# Patient Record
Sex: MALE | Race: BLACK OR AFRICAN AMERICAN | NOT HISPANIC OR LATINO | Employment: OTHER | URBAN - METROPOLITAN AREA
[De-identification: names, ages, dates, MRNs, and addresses within clinical notes are randomized per-mention and may not be internally consistent; named-entity substitution may affect disease eponyms.]

---

## 2018-05-22 ENCOUNTER — TELEPHONE (OUTPATIENT)
Dept: TRANSPLANT | Facility: CLINIC | Age: 68
End: 2018-05-22

## 2018-07-05 DIAGNOSIS — Z76.82 ORGAN TRANSPLANT CANDIDATE: Primary | ICD-10-CM

## 2018-07-18 ENCOUNTER — HOSPITAL ENCOUNTER (OUTPATIENT)
Dept: RADIOLOGY | Facility: HOSPITAL | Age: 68
Discharge: HOME | End: 2018-07-18
Attending: NURSE PRACTITIONER
Payer: MEDICARE

## 2018-07-18 ENCOUNTER — OFFICE VISIT (OUTPATIENT)
Dept: TRANSPLANT | Facility: CLINIC | Age: 68
End: 2018-07-18
Payer: MEDICARE

## 2018-07-18 ENCOUNTER — CLINICAL SUPPORT (OUTPATIENT)
Dept: INFECTIOUS DISEASES | Facility: CLINIC | Age: 68
End: 2018-07-18
Payer: MEDICARE

## 2018-07-18 ENCOUNTER — DOCUMENTATION ONLY (OUTPATIENT)
Dept: TRANSPLANT | Facility: CLINIC | Age: 68
End: 2018-07-18

## 2018-07-18 VITALS
OXYGEN SATURATION: 97 % | RESPIRATION RATE: 16 BRPM | HEART RATE: 73 BPM | BODY MASS INDEX: 32.36 KG/M2 | WEIGHT: 218.5 LBS | DIASTOLIC BLOOD PRESSURE: 75 MMHG | SYSTOLIC BLOOD PRESSURE: 136 MMHG | TEMPERATURE: 98 F | HEIGHT: 69 IN

## 2018-07-18 DIAGNOSIS — D63.1 ANEMIA OF RENAL DISEASE: Chronic | ICD-10-CM

## 2018-07-18 DIAGNOSIS — Z76.82 ORGAN TRANSPLANT CANDIDATE: Primary | ICD-10-CM

## 2018-07-18 DIAGNOSIS — Z01.818 PRE-TRANSPLANT EVALUATION FOR CKD (CHRONIC KIDNEY DISEASE): ICD-10-CM

## 2018-07-18 DIAGNOSIS — E11.21 TYPE 2 DIABETES MELLITUS WITH DIABETIC NEPHROPATHY, WITH LONG-TERM CURRENT USE OF INSULIN: Chronic | ICD-10-CM

## 2018-07-18 DIAGNOSIS — Z79.4 TYPE 2 DIABETES MELLITUS WITH DIABETIC NEPHROPATHY, WITH LONG-TERM CURRENT USE OF INSULIN: Chronic | ICD-10-CM

## 2018-07-18 DIAGNOSIS — N18.6 ESRD ON DIALYSIS: Primary | Chronic | ICD-10-CM

## 2018-07-18 DIAGNOSIS — Z76.82 ORGAN TRANSPLANT CANDIDATE: ICD-10-CM

## 2018-07-18 DIAGNOSIS — N18.9 ANEMIA OF RENAL DISEASE: Chronic | ICD-10-CM

## 2018-07-18 DIAGNOSIS — I10 ESSENTIAL HYPERTENSION: ICD-10-CM

## 2018-07-18 DIAGNOSIS — R30.0 DYSURIA: ICD-10-CM

## 2018-07-18 DIAGNOSIS — Z87.898 HISTORY OF SYNCOPE: ICD-10-CM

## 2018-07-18 DIAGNOSIS — Z99.2 ESRD ON DIALYSIS: Primary | Chronic | ICD-10-CM

## 2018-07-18 DIAGNOSIS — E78.5 HYPERLIPIDEMIA, UNSPECIFIED HYPERLIPIDEMIA TYPE: ICD-10-CM

## 2018-07-18 LAB
BACTERIA #/AREA URNS AUTO: ABNORMAL /HPF
BILIRUB UR QL STRIP: NEGATIVE
CLARITY UR REFRACT.AUTO: CLEAR
COLOR UR AUTO: YELLOW
CREAT UR-MCNC: 168 MG/DL
GLUCOSE UR QL STRIP: ABNORMAL
HGB UR QL STRIP: NEGATIVE
HYALINE CASTS UR QL AUTO: 4 /LPF
KETONES UR QL STRIP: NEGATIVE
LEUKOCYTE ESTERASE UR QL STRIP: NEGATIVE
MICROSCOPIC COMMENT: ABNORMAL
NITRITE UR QL STRIP: NEGATIVE
PH UR STRIP: 6 [PH] (ref 5–8)
PROT UR QL STRIP: ABNORMAL
PROT UR-MCNC: 98 MG/DL
PROT/CREAT RATIO, UR: 0.58
RBC #/AREA URNS AUTO: 1 /HPF (ref 0–4)
SP GR UR STRIP: 1.01 (ref 1–1.03)
URN SPEC COLLECT METH UR: ABNORMAL
UROBILINOGEN UR STRIP-ACNC: 2 EU/DL
WBC #/AREA URNS AUTO: 1 /HPF (ref 0–5)

## 2018-07-18 PROCEDURE — 3045F PR MOST RECENT HEMOGLOBIN A1C LEVEL 7.0-9.0%: CPT | Mod: CPTII,S$GLB,TXP, | Performed by: INTERNAL MEDICINE

## 2018-07-18 PROCEDURE — 72170 X-RAY EXAM OF PELVIS: CPT | Mod: TC,FY,TXP

## 2018-07-18 PROCEDURE — 71046 X-RAY EXAM CHEST 2 VIEWS: CPT | Mod: TC,FY,TXP

## 2018-07-18 PROCEDURE — 76770 US EXAM ABDO BACK WALL COMP: CPT | Mod: 26,TXP,, | Performed by: RADIOLOGY

## 2018-07-18 PROCEDURE — 99203 OFFICE O/P NEW LOW 30 MIN: CPT | Mod: S$GLB,TXP,, | Performed by: TRANSPLANT SURGERY

## 2018-07-18 PROCEDURE — 99204 OFFICE O/P NEW MOD 45 MIN: CPT | Mod: S$GLB,TXP,, | Performed by: PHYSICIAN ASSISTANT

## 2018-07-18 PROCEDURE — 84156 ASSAY OF PROTEIN URINE: CPT | Mod: TXP

## 2018-07-18 PROCEDURE — 81001 URINALYSIS AUTO W/SCOPE: CPT | Mod: TXP

## 2018-07-18 PROCEDURE — 99999 PR PBB SHADOW E&M-EST. PATIENT-LVL IV: CPT | Mod: PBBFAC,TXP,, | Performed by: INTERNAL MEDICINE

## 2018-07-18 PROCEDURE — 76770 US EXAM ABDO BACK WALL COMP: CPT | Mod: TC,TXP

## 2018-07-18 PROCEDURE — 90632 HEPA VACCINE ADULT IM: CPT | Mod: S$GLB,TXP,,

## 2018-07-18 PROCEDURE — 71046 X-RAY EXAM CHEST 2 VIEWS: CPT | Mod: 26,TXP,, | Performed by: RADIOLOGY

## 2018-07-18 PROCEDURE — 72170 X-RAY EXAM OF PELVIS: CPT | Mod: 26,TXP,, | Performed by: RADIOLOGY

## 2018-07-18 PROCEDURE — 99205 OFFICE O/P NEW HI 60 MIN: CPT | Mod: S$GLB,TXP,, | Performed by: INTERNAL MEDICINE

## 2018-07-18 PROCEDURE — 90471 IMMUNIZATION ADMIN: CPT | Mod: S$GLB,TXP,,

## 2018-07-18 RX ORDER — PREGABALIN 150 MG/1
150 CAPSULE ORAL 2 TIMES DAILY
COMMUNITY

## 2018-07-18 RX ORDER — PANTOPRAZOLE SODIUM 40 MG/1
40 TABLET, DELAYED RELEASE ORAL DAILY
COMMUNITY

## 2018-07-18 RX ORDER — ATORVASTATIN CALCIUM 40 MG/1
40 TABLET, FILM COATED ORAL DAILY
COMMUNITY

## 2018-07-18 RX ORDER — INSULIN ASPART 100 [IU]/ML
INJECTION, SOLUTION INTRAVENOUS; SUBCUTANEOUS
COMMUNITY

## 2018-07-18 RX ORDER — CLONIDINE HYDROCHLORIDE 0.2 MG/1
0.2 TABLET ORAL 2 TIMES DAILY
COMMUNITY

## 2018-07-18 RX ORDER — DULOXETIN HYDROCHLORIDE 60 MG/1
60 CAPSULE, DELAYED RELEASE ORAL DAILY
COMMUNITY

## 2018-07-18 RX ORDER — TAMSULOSIN HYDROCHLORIDE 0.4 MG/1
0.4 CAPSULE ORAL DAILY
COMMUNITY

## 2018-07-18 RX ORDER — ASPIRIN 81 MG/1
81 TABLET ORAL DAILY
COMMUNITY

## 2018-07-18 RX ORDER — TRAMADOL HYDROCHLORIDE 50 MG/1
50 TABLET ORAL 2 TIMES DAILY
COMMUNITY

## 2018-07-18 NOTE — PATIENT INSTRUCTIONS
1. Try to exercise more   2. Check your blood sugar when you get sweaty  3. Give a urine specimen today   4. Stop chewing tobacco and all tobacco products  5. Try to look for any potential donors

## 2018-07-18 NOTE — PROGRESS NOTES
Pre Transplant Infectious Diseases Consult  Kidney Transplant Recipient Evaluation    Requesting Physician: Mando Pedersen MD    Reason for Visit:    Chief Complaint   Patient presents with    Kidney Transplant Pre-evaluation     History of Present Illness  Quinton Nguyen is a 68 y.o. year old Black or  male with advanced Kidney disease currently being evaluated for Kidney transplant. He has been on HD since Sept 2017 through fistula.   Patient denies any recent fever, chills, or infective illnesses.      1) Do you have a history of:   YES NO   Diabetes      [x] []     Diabetic Foot Infection/Bone Infection  []        [x]     Surgical Removal of Spleen   []  [x]                  2) Have you had recurrent infections involving:             YES NO  Sinus infections  []         [x]   Sore Throat   []         [x]                 Prostate Infections  []         [x]              Bladder Infections  []         [x]                     Kidney Infections  []         [x]                               Intestinal Infections  []         [x]      Skin Infections   []         [x]       Reproductive Infections          []  [x]   Periodontal Disease  []         [x]        3)Have you ever had: YES     NO UNKNOWN      Chicken Pox   []         [x]  []   Shingles   [x]         []  [] Approx 5 yrs  Orolabial Herpes             [x]  []  []   Genital Herpes  []         [x]  []   Cytomegalovirus  []         [x]  []   Barney-Barr Virus  []         [x]  []   Genital Warts   []         [x]  []   Hepatitis A   []         [x]  []   Hepatitis B   []         [x]  []   Hepatitis C   []         [x]  []   Syphilis   []         [x]  []   Gonorrhea   []         [x]  []   Pelvic Inflammatory   Disease   []         []  []   Chlamydia Infection  []         []  [x]   Intestinal parasites   or worms   []         []  [x]   Fungal Infections  []         []  [x]   Blood Infections  []         []  [x]       Comment:       4) Have you ever been  exposed   YES NO  To someone with tuberculosis?  []   [x]   If yes, what treatment did you receive:     5) What states have you lived in? [place]    6) What countries have you visited for more than 2 weeks?    None                     YES NO  7) Did you have any associated infections?  []  [x]       8) Are you planning to travel outside the    []  [x]   [place] after your transplant?    9) Household                   YES NO  Do you have pets living in your house?    []         [x]   If yes, describe:     Do you spend time or live on a farm or     []         [x]   have livestock or other farm animals?  If yes, which ones:    Do you have a fish tank?          []  [x]       Do you have a litter box?      []         [x]     Do you fish or hunt?       []         [x]     Do you clean or skin fish or animals?    [x]         []     Do you consume raw or undercooked    []         [x]   meat, fish, or shellfish?      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          12)Do you garden or otherwise  YES NO   work in the soil?    []         [x]   13)Do you hike, camp, or spend     time in wooded areas?   []         [x]        14) The patient's immunization history was reviewed.    Have you ever received:  YES NO UNKNOWN DATES   Routine Childhood vaccines  [x]         []  []      Influenza vaccine   [x]  []  [] 2018/18   Pneumovax    [x]  []  [] 1/2018    Tetanus-diptheria   []         [x]  []    Hepatitis A vaccine series       []  [x]  []    Hepatitis B vaccine series         [x]  []  []    Meningitis vaccine   []         [x]  []    Varicella vaccine   []         [x]  []        Based on the patients immunization history and serologies, immunizations were ordered:         Ordered  Not Ordered  Influenza Vaccine     []    [x]   Hepatitis A series at 0,  6 months   [x]    []   Hepatitis B seriesat 0, 1, and 6 months  []    [x]   Hepatitis B High Dose 0,1, and 6  months  []    [x]   Prevnar      [x]    []   Pneumovax      []    [x]    TDap       [x]    []    Zoster/Shinrix      [x]    []   Menactra      []    [x]            The patient was encouraged to contact us about any problems that may develop after immunization and possible side effects were reviewed.      Previous Transplant: no    Etiology of Kidney Disease: diabetic nephropathy    Allergies: Patient has no known allergies.    There is no immunization history on file for this patient.  History reviewed. No pertinent past medical history.  History reviewed. No pertinent surgical history.   Social History     Social History    Marital status:      Spouse name: N/A    Number of children: N/A    Years of education: N/A     Occupational History    Not on file.     Social History Main Topics    Smoking status: Never Smoker    Smokeless tobacco: Not on file    Alcohol use No    Drug use: No    Sexual activity: Yes     Other Topics Concern    Not on file     Social History Narrative    No narrative on file       Review of Systems   Constitution: Negative for chills, decreased appetite, fever, weakness, malaise/fatigue, night sweats, weight gain and weight loss.   HENT: Negative for congestion, ear pain, hearing loss, hoarse voice, sore throat and tinnitus.    Eyes: Negative for blurred vision, redness and visual disturbance.   Cardiovascular: Positive for leg swelling. Negative for chest pain and palpitations.   Respiratory: Negative for cough, hemoptysis, shortness of breath, sputum production and wheezing.    Endocrine: Positive for cold intolerance. Negative for heat intolerance.   Hematologic/Lymphatic: Negative for adenopathy. Does not bruise/bleed easily.   Skin: Negative for dry skin, itching, rash and suspicious lesions.   Musculoskeletal: Positive for back pain. Negative for joint pain, myalgias and neck pain.   Gastrointestinal: Positive for constipation. Negative for abdominal pain, diarrhea,  heartburn, nausea and vomiting.   Genitourinary: Negative for dysuria, flank pain, frequency, hematuria, hesitancy and urgency.   Neurological: Positive for numbness (neuropath) and paresthesias. Negative for dizziness and headaches.   Psychiatric/Behavioral: Negative for depression and memory loss. The patient does not have insomnia and is not nervous/anxious.    Allergic/Immunologic: Negative for environmental allergies, HIV exposure, hives and persistent infections.     Physical Exam   Constitutional: He is oriented to person, place, and time. He appears well-developed and well-nourished.       HENT:   Head: Normocephalic and atraumatic.   Mouth/Throat: Uvula is midline, oropharynx is clear and moist and mucous membranes are normal. He does not have dentures. No oral lesions. Abnormal dentition (multiple missing teeth). Dental caries present. No dental abscesses or lacerations.   Eyes: Conjunctivae and lids are normal. Pupils are equal, round, and reactive to light. No scleral icterus.   Neck: Neck supple.   Cardiovascular: Normal rate and regular rhythm.  Exam reveals no gallop and no friction rub.    No murmur heard.  Pulmonary/Chest: Effort normal and breath sounds normal. No respiratory distress. He has no decreased breath sounds. He has no wheezes. He has no rhonchi. He has no rales.   Abdominal: Soft. Normal appearance, normal aorta and bowel sounds are normal. He exhibits no distension and no mass. There is no hepatosplenomegaly. There is no tenderness. There is no rebound and no guarding.   Musculoskeletal: He exhibits no edema.   Lymphadenopathy:        Head (right side): No submental, no submandibular, no tonsillar, no preauricular, no posterior auricular and no occipital adenopathy present.        Head (left side): No submental, no submandibular, no tonsillar, no preauricular, no posterior auricular and no occipital adenopathy present.     He has no cervical adenopathy.     He has no axillary  adenopathy.        Right: No inguinal, no supraclavicular and no epitrochlear adenopathy present.        Left: No inguinal, no supraclavicular and no epitrochlear adenopathy present.   Neurological: He is alert and oriented to person, place, and time. No cranial nerve deficit.   Skin: Skin is warm, dry and intact. No lesion and no rash noted. He is not diaphoretic. No erythema. No pallor.   Psychiatric: He has a normal mood and affect. His behavior is normal.     Diagnostics: No results found for: RPR  No results found for: CMVANTIBODIE  No results found for: HIV1X2  No results found for: HTLVIIIANTIB  No results found for: HEPBSAG  No results found for: HEPBCAB  No results found for: HEPCAB  No results found for: TOXOIGG  No components found for: TOXOIGGINTER  No results found for: KLG5IOE  No results found for: SAA8YWE  No results found for: VARICELLAZOS  No results found for: VARICELLAINT  No results found for: STRONGANTIGG  No results found for: EPSTEINBARRV  No results found for: HEPBSAB  No results found for: QUANTIFERON  No results found for: HEPAIGM  No results found for: PPD           Transplant Infectious Diseases - Candidacy    Assessment/Plan:     Transplant Candidacy: Based on available information, there are no identified significant barriers to transplantation from an infectious disease standpoint pending acceptable serologies.  Refer to ID clinic for any serologies that require further evaluation.  It is recommended the patient have their teeth treated prior to transplant.    Final determination of transplant candidacy will be made once evaluation is complete and reviewed by the Transplant Selection Committee.    PRISCILLA Dominguez  Vaccine needs:  1. Hep A today and 6 months  2. Tdap today  3. Prevnar in Jan 2019  4. Shingrix - Rx given as not currently available  RXs given for follow up vaccines       Counseling:  It is recommended the patient have their teeth treated prior to transplant. I  discussed with Quinton the risk for increased susceptibility to infections following transplantation including increased risk for infection right after transplant and if rejection should occur.  The patients has been counseled on the importance of vaccinations including but not limited to a yearly flu vaccine.  Specific guidance has been provided to the patient regarding the patients occupation, hobbies and activities to avoid future infectious complications including but not limited to avoiding undercooked meats and seafood, proper hygiene, and contact with animals.

## 2018-07-18 NOTE — PROGRESS NOTES
PHARM.D. PRE-TRANSPLANT NOTE:    This patient's medication therapy was evaluated as part of his pre-transplant evaluation.    The following pharmacologic concerns were noted: aspirin      Current Outpatient Prescriptions   Medication Sig Dispense Refill    aspirin (ECOTRIN) 81 MG EC tablet Take 81 mg by mouth once daily.      atorvastatin (LIPITOR) 40 MG tablet Take 40 mg by mouth once daily.      cloNIDine (CATAPRES) 0.2 MG tablet Take 0.2 mg by mouth 2 (two) times daily.      DULoxetine (CYMBALTA) 60 MG capsule Take 60 mg by mouth once daily.      insulin aspart U-100 (NOVOLOG FLEXPEN U-100 INSULIN) 100 unit/mL InPn pen Inject into the skin. Inject per Sliding scale three times a day with meals (3-5 units)      insulin detemir U-100 (LEVEMIR) 100 unit/mL injection Inject 40 Units into the skin every evening.       pantoprazole (PROTONIX) 40 MG tablet Take 40 mg by mouth once daily.      pregabalin (LYRICA) 150 MG capsule Take 150 mg by mouth 2 (two) times daily.      tamsulosin (FLOMAX) 0.4 mg Cap Take 0.4 mg by mouth once daily.      traMADol (ULTRAM) 50 mg tablet Take 50 mg by mouth 2 (two) times daily.       No current facility-administered medications for this visit.          Currently Mr Nguyen is responsible for preparing / administering this patient's medications on a daily basis.  I am available for consultation and can be contacted, as needed by the other members of the Kidney Transplant team.

## 2018-07-18 NOTE — PROGRESS NOTES
Transplant Recipient Adult Psychosocial Assessment    Quinton Nguyen  [address]  [place] [address]    Telephone Information:   Mobile [phone]   Home  [phone] (home)  Work  There is no work phone number on file.  E-mail  No e-mail address on record    Sex: male  YOB: 1950  Age: 68 y.o.    Encounter Date: 7/18/2018  [place] Citizen: yes  Primary Language: English   Needed: no    Emergency Contact:  Annette Patel, 64 yo [place], does drive/own car, unemployed. [phone]  Kirt Nguyen, 47 yo son, [place], does drive/own car, works at [employer] full time. [phone]  Portia Nguyen, 40 yo daughter, Ayaz SHARMA, does drive/own car, works as nurse practioner. [phone]    Family/Social Support:   Number of dependents/: pt denies  Marital history: not ;  1992. In long term significant other relationship with Annette for 26 years.  Other family dynamics: Pt reports large and supportive family and reports najma Bryant and his two biological children Kirt and Portia will assist with transplant. Annette reports will be patient's primary transplant caregiver.    Household Composition:  Annette Patel, 62 yo [place], does drive/own car, unemployed. [phone]  -Star May, 16 yo [facility] of najma Bryant    Do you and your caregivers have access to reliable transportation? yes  PRIMARY CAREGIVER: najma Bull, will be primary caregiver, phone number [phone].      provided in-depth information to patient and caregiver regarding pre- and post-transplant caregiver role.   strongly encourages patient and caregiver to have concrete plan regarding post-transplant care giving, including back-up caregiver(s) to ensure care giving needs are met as needed.    Patient and Caregiver states understanding all aspects of caregiver role/commitment and is able/willing/committed to being  caregiver to the fullest extent necessary.    Patient and Caregiver verbalizes understanding of the education provided today and caregiver responsibilities.         remains available. Patient and Caregiver agree to contact  in a timely manner if concerns arise.      Able to take time off work without financial concerns: yes.     Additional Significant Others who will Assist with Transplant:  Kirt Nguyen, 47 yo son, [place] SHARMA, does drive/own car, works at [employer] full time. [phone]  Portia Nguyen, 38 yo daughter, Ayaz [place], does drive/own car, works as nurse practioner. [phone]    Living Will: no  Healthcare Power of : no  Advance Directives on file: <<no information> per medical record.  Verbally reviewed LW/HCPA information.   provided patient with copy of LW/HCPA documents and provided education on completion of forms.    Living Donors: Education and resource information given to patient.    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    Spouse/Significant Other Employment:  Pt reports is not     Disabled: no    Monthly Income:  $1244.00  Able to afford all costs now and if transplanted, including medications: yes  Patient and Caregiver verbalizes understanding of personal responsibilities related to transplant costs and the importance of having a financial plan to ensure that patients transplant costs are fully covered.       provided fundraising information/education. Patient and Caregiververbalizes understanding.  Social  worker remains available.    Insurance:   Payor/Plan Subscr  Sex Relation Sub. Ins. ID Effective Group Num   1. [employer] MANAGE* KAILA EDWARDS 1950 Male  [id number]                                   [address] [address]     Primary Insurance (for UNOS reporting): Public Insurance - Medicare & Choice  Secondary Insurance (for UNOS reporting): None  Patient and Caregiver verbalizes clear understanding that patient may experience difficulty obtaining and/or be denied insurance coverage post-surgery. This includes and is not limited to disability insurance, life insurance, health insurance, burial insurance, long term care insurance, and other insurances.      Patient and Caregiver also reports understanding that future health concerns related to or unrelated to transplantation may not be covered by patient's insurance.  Resources and information provided and reviewed.     Patient and Caregiver provides verbal permission to release any necessary information to outside resources for patient care and discharge planning.  Resources and information provided are reviewed.      [facility] [phone]. Hemodialysis Tues, Thurs and Saturday 3 hours.    Dialysis Adherence: Patient and Caregiver reports high compliance with all dialysis treatments and instructions.  Dialysis reports patient has high compliance with all dialysis treatments and instructions 0 AMAs, 0 missed appointments and 0 psychosocial issues. Completed dialysis compliance form in 18 social work progress note.    Infusion Service: patient utilizing? no  Home Health: patient utilizing? no  DME: no  Pulmonary/Cardiac Rehab: pt denies  ADLS:  Independent; fiance reports does assist with medication management    Adherence:   Adherence education and counseling provided.     Per History Section:  Past Medical History:   Diagnosis Date    Anemia of renal disease     ESRD on dialysis since 15     Essential hypertension     History of  "syncope     HLD (hyperlipidemia)     Type 2 diabetes mellitus with renal complication      Social History   Substance Use Topics    Smoking status: Former Smoker    Smokeless tobacco: Current User     Types: Chew, Snuff      Comment: quit smoking cigarettes in the 1980s; smoked cigarettes for 2-3 months and at the most 1 ppw; he quit smoking cigars in the 1990s - states he smoked cigars for 4 years and at the most smoked 4 cigars a day    Alcohol use No      Comment: quit in ~2012; previously was a social beer drinker     History   Drug Use No     History   Sexual Activity    Sexual activity: Yes       Per Today's Psychosocial:  Tobacco: former cigarette user and quit all use "years ago". Pt reports is still using chewing tobacco (see above).  Alcohol: none, patient denies any use at this time. Pt reports former social beer user until about 6 years ago when he quit on his own. Pt reports plan to abstain.  Illicit Drugs/Non-prescribed Medications: none, patient denies any use.    Patient and Caregiver states clear understanding of the potential impact of substance use as it relates to transplant candidacy and is aware of possible random substance screening.  Substance abstinence/cessation counseling, education and resources provided and reviewed.     Arrests/DWI/Treatment/Rehab: patient denies    Psychiatric History:    Mental Health: pt denies any mental health history. Pt denies any overwhelming feelings of depression or anxiety and denies need for mental health treatment.  Psychiatrist/Counselor: pt denies  Medications:  Pt denies  Suicide/Homicide Issues: pt denies   Safety at home: pt reports having a safe home and denies any abuse in the home    Knowledge: Patient and Caregiver states having clear understanding and realistic expectations regarding the potential risks and potential benefits of organ transplantation and organ donation and agrees to discuss with health care team members and support system " members, as well as to utilize available resources and express questions and/or concerns in order to further facilitate the pt informed decision-making.  Resources and information provided and reviewed.    Patient and Caregiver is aware of [facility]'s affiliation and/or partnership with agencies in home health care, LTAC, SNF, [facility], and other hospitals and clinics.    Understanding: Patient and Caregiver reports having a clear understanding of the many lifetime commitments involved with being a transplant recipient, including costs, compliance, medications, lab work, procedures, appointments, concrete and financial planning, preparedness, timely and appropriate communication of concerns, abstinence (ETOH, tobacco, illicit non-prescribed drugs), adherence to all health care team recommendations, support system and caregiver involvement, appropriate and timely resource utilization and follow-through, mental health counseling as needed/recommended, and patient and caregiver responsibilities.  Social Service Handbook, resources and detailed educational information provided and reviewed.  Educational information provided.    Patient and Caregiver also reports current and expected compliance with health care regime and states having a clear understanding of the importance of compliance.      Patient and Caregiver reports a clear understanding that risks and benefits may be involved with organ transplantation and with organ donation.       Patient and Caregiver also reports clear understanding that psychosocial risk factors may affect patient, and include but are not limited to feelings of depression, generalized anxiety, anxiety regarding dependence on others, post traumatic stress disorder, feelings of guilt and other emotional and/or mental concerns, and/or exacerbation of existing mental health concerns.  Detailed resources provided and discussed.      Patient and Caregiver agrees to access appropriate resources in a  "timely manner as needed and/or as recommended, and to communicate concerns appropriately.  Patient and Caregiver also reports a clear understanding of treatment options available.     Patient and Caregiver received education in a group setting.   reviewed education, provided additional information, and answered questions.    Feelings or Concerns: Pt reports high motivation to pursue organ transplant.    Coping: pt reports is coping well over all with ESRD and need for organ transplant. Pt reports jelani best by staying active with visits from friends and family. Pt reports does attend [employer] regularly.    Goals: Pt reports hope to be transplanted so he may discontinue dialysis and have a "normal" life.    Interview Behavior: Patient and Caregiver presents as alert and oriented x 4, pleasant, good eye contact, well groomed, recall good, concentration/judgement good, average intelligence, calm, communicative, cooperative and asking and answering questions appropriately. Pt's fiance and son in room with permission. Both fiance and son were highly engaged and supportive of patient throughout the session.         Transplant Social Work - Candidacy  Assessment/Plan:     Psychosocial Suitability: Patient presents as a suitable candidate for kidney transplant at this time. Based on psychosocial risk factors, patient presents as low risk, due to patient reporting having transplant caregiver and transportation plan, medical insurance plan and plan to afford transplant costs all in place.    Recommendations/Additional Comments: Pt to contact transplant team with any concerns.    Breanna ORTEGA LCSW         "

## 2018-07-18 NOTE — LETTER
July 18, 2018        Jasen Mcmahon  [address] [facility] [address]  [address]  [place] [address]  Phone: [phone]  Fax: [phone]             [facility]  [address]  [facility] [address]  Phone: [phone]   Patient: Quinton Nguyen   MR Number: [id number]   YOB: 1950   Date of Visit: 7/18/2018       Dear Dr. Jasen Mcmahon    Thank you for referring Quinton Nguyen to me for evaluation. Attached you will find relevant portions of my assessment and plan of care.    If you have questions, please do not hesitate to call me. I look forward to following Quinton Nguyen along with you.    Sincerely,    Edwina Santana MD    Enclosure    If you would like to receive this communication electronically, please contact [email or web address] or [phone] to request [employer] Link access.    [employer] Link is a tool which provides read-only access to select patient information with whom you have a relationship. Its easy to use and provides real time access to review your patients record including encounter summaries, notes, results, and demographic information.    If you feel you have received this communication in error or would no longer like to receive these types of communications, please e-mail [email or web address]

## 2018-07-18 NOTE — PROGRESS NOTES
CLINIC TEACHING NOTE    Quinton Nguyen was seen in transplant clinic today.  Coordinator verified that the patient viewed the teaching video and received written educational material.    The following information was reviewed:  · Waiting list time for cadaveric vs. living donation  · Waiting list process including: back-up calls, notification of changes in contact information, dialysis/physician information to the transplant coordinator, notifications of changes in health or if hospitalized, and notification of travel out of the area  · Monthly antibody testing to be obtained by the dialysis unit or arranged through a local lab and mailed to the transplant center.  Patient informed that if blood is not sent monthly and a kidney becomes available, the patient will need to come to the hospital to provide a fresh sample of blood for testing.    Patient was instructed that once on the waiting list, an appointment with the transplant physician will be scheduled yearly or every 6 months to ensure patient remains an acceptable transplant candidate.    Patient also informed that at the time of transplant, participation in a research protocol may be offered.  Notified that this is strictly voluntary and will not affect the quality of care received.      The option to have name placed on multiple transplant lists to increase opportunity for transplant was reviewed.    All patient questions were answered.  Patient verbalized understanding of above information.    Patient presents as a suitable candidate for transplant.

## 2018-07-18 NOTE — PROGRESS NOTES
Transplant Surgery  Kidney Transplant Recipient Evaluation    Referring Physician: Jasen Mcmahon  Current Nephrologist: Jasen Mcmahon    Subjective:     Reason for Visit: evaluate transplant candidacy    History of Present Illness: Quinton Nguyen is a 68 y.o. year old male undergoing transplant evaluation.    Dialysis History: Quinton is on hemodialysis.      Transplant History: N/A    Etiology of Renal Disease: Diabetes Mellitus - Type II (based on medical records from referral).    Review of Systems   Constitutional: Negative for activity change, appetite change, chills and fever.   HENT: Negative for congestion, nosebleeds, sinus pressure, sore throat and voice change.    Eyes: Negative for pain and visual disturbance.   Respiratory: Positive for chest tightness. Negative for cough and shortness of breath.    Cardiovascular: Positive for chest pain. Negative for palpitations and leg swelling.   Gastrointestinal: Negative for abdominal distention, abdominal pain, diarrhea, nausea and vomiting.   Endocrine: Negative for cold intolerance and heat intolerance.   Genitourinary: Negative for dysuria, flank pain, frequency and hematuria.   Musculoskeletal: Negative for back pain and gait problem.        Doesn't walk much.  Has pain when walking moderate distances     Skin: Negative for color change, pallor and wound.   Allergic/Immunologic: Negative for food allergies.   Neurological: Negative for dizziness, seizures, numbness and headaches.   Hematological: Negative for adenopathy.   Psychiatric/Behavioral: Negative for agitation, behavioral problems, hallucinations and sleep disturbance.       Objective:     Physical Exam:  Constitutional:   Vitals reviewed: yes   Well-nourished and well-groomed: yes  Eyes:   Sclerae icteric: no   Extraocular movements intact: yes  GI:    Bowel sounds normal: yes   Tenderness: no    If yes, quadrant/location: not applicable   Palpable masses: no    If yes, quadrant/location:  not applicable   Hepatosplenomegaly: no   Ascites: no   Hernia: no    If yes, type/location: not applicable   Surgical scars: yes    If yes, type/location: laparoscopic port sites   Obese  with large / distended abdomen  Resp:   Effort normal: yes   Breath sounds normal: yes    CV:   Regular rate and rhythm: yes   Heart sounds normal: yes   Femoral pulses normal: yes   Extremities edematous: no  Skin:   Rashes or lesions present: no    If yes, describe:not applicable   Jaundice:: no    Musculoskeletal:   Gait normal: yes   Strength normal: yes  Psych:   Oriented to person, place, and time: yes   Affect and mood normal: yes    Additional comments: not applicable    Counseling: We provided Quinton Nguyen with a group education session today.  We discussed kidney transplantation at length with him, including risks, potential complications, and alternatives in the management of his renal failure.  The discussion included complications related to anesthesia, bleeding, infection, primary nonfunction, and ATN.  I discussed the typical postoperative course, length of hospitalization, the need for long-term immunosuppression, and the need for long-term routine follow-up.  I discussed living-donor and -donor transplantation and the relative advantages and disadvantages of each.  I also discussed average waiting times for both living donation and  donation.  I discussed national and center-specific survival rates.  I also mentioned the potential benefit of multicenter listing to candidates listed with centers within more than one organ procurement organization.  All questions were answered.    Final determination of transplant candidacy will be made once evaluation is complete and reviewed by the Kidney & Kidney/Pancreas Selection Committee.         Transplant Surgery - Candidacy   Assessment/Plan:   Quinton Nguyen has end stage renal disease (ESRD) on dialysis. I have concerns with his age and comorbidities  and seemingly poor functional status.  . Based on available information, Quinton Nguyen is a high-risk kidney transplant candidate.     Flash Gupta Jr, MD

## 2018-07-18 NOTE — PROGRESS NOTES
Transplant Nephrology  Kidney Transplant Recipient Evaluation    Referring Physician: Jasen Mcmahon  Current Nephrologist: Jasen Mcmahon    Subjective:   CC:  Initial evaluation of kidney transplant candidacy.    HPI:  Mr. Nguyen is a 68 y.o. year old Black or  male who has presented to be evaluated as a potential kidney transplant recipient.  He has ESRD secondary to diabetic nephropathy and HTN. He never had a native kidney biopsy. Patient is currently on hemodialysis started on 9/30/17. Patient is dialyzing on TTS schedule.  Patient reports that he is tolerating dialysis well.. He has a LUE AV fistula for dialysis access. He dialyzes for 3 hours and will have hypotension every dialysis session to the 90s/50s. He doesn't know what his dry weigh is or how much he gains in between sessions. States he had AVF created in 2016 and since he started dialysis he has had thrombosis 2-3 episodes and states he is to have them look at it on 7/25/18.  Denies prolonged bleeding after needle removal. He urinates ~3 times a day - he can't estimate how much residual UOP he has. His last blood transfusion was in 2017 right before he was started on dialysis.     He was diagnosed with DM in his 50-60s. He initially was on oral agents but started insulin ~6-7 years ago. Fiance states he will have hypoglycemia a couple times a morning every week. He states he has decent hypoglycemia awareness. Denies DR or gastroparesis but states he has peripheral neuropathy.     He was diagnosed with HTN in his 30s. States home BPs are variable but he doesn't really recall the values.     Son states that he has had a few syncopal episodes (he states 3 but patient states 4 times) in 2017. Unclear etiology - per son they ran tests but didn't tell them anything. [facility].     He quit smoking cigarettes in the 1980s; smoked cigarettes for 2-3 months and at the most 1 ppw; he  "quit smoking cigars in the 1990s - states he smoked cigars for 4 years and at the most smoked 4 cigars a day. He is still chewing tobacco - states he has been chewing tobacco since age 5-6 - states a plug lasts him a week.     He denies any MI, CVA, cancer, DVT    He is accompanied to visit by his son and najma    Previous Transplant: no    Functional Status: He will exercise every now and then - states he will do push ups and do resistance exercises. States he can't walk secondary to having pain in his groin when he walks - states they tried to investigate the cause but unable to detect anything (son states they did MRI, x-ray, no CT scan however). He lives with najma in 1 story house without any stairs. He last mowed the lawn 10-12 years ago. He doesn't walk down the block - he will drive there. Patient was able to walk with this writer from clinic room #13 to the main entrance of [facility] on [address] and back to clinic exam room without any FITZGERALD or chest pain but states that if we had to walk any further he would have had to stop secondary to "groin pain"    Past Medical History:  Past Medical History:   Diagnosis Date    Anemia of renal disease     ESRD on dialysis since 9/30/17     Essential hypertension     History of syncope     HLD (hyperlipidemia)        Past Surgical History:   Past Surgical History:   Procedure Laterality Date    AV FISTULA PLACEMENT Left 2016    HERNIA REPAIR  2016       Medications:   Current Outpatient Prescriptions   Medication Sig Dispense Refill    aspirin (ECOTRIN) 81 MG EC tablet Take 81 mg by mouth once daily.      atorvastatin (LIPITOR) 40 MG tablet Take 40 mg by mouth once daily.      cloNIDine (CATAPRES) 0.2 MG tablet Take 0.2 mg by mouth 2 (two) times daily.      DULoxetine (CYMBALTA) 60 MG capsule Take 60 mg by mouth once daily.      insulin aspart U-100 (NOVOLOG FLEXPEN U-100 INSULIN) 100 unit/mL InPn pen Inject into the skin. Inject per Sliding scale three times a " day with meals (3-5 units)      insulin detemir U-100 (LEVEMIR) 100 unit/mL injection Inject 40 Units into the skin every evening.       pantoprazole (PROTONIX) 40 MG tablet Take 40 mg by mouth once daily.      pregabalin (LYRICA) 150 MG capsule Take 150 mg by mouth 2 (two) times daily.      tamsulosin (FLOMAX) 0.4 mg Cap Take 0.4 mg by mouth once daily.      traMADol (ULTRAM) 50 mg tablet Take 50 mg by mouth 2 (two) times daily.      diphth,pertus,acell,,tetanus (BOOSTRIX) 2.5-8-5 Lf-mcg-Lf/0.5mL Susp Inject 0.5 mLs into the muscle once. for 1 dose 0.5 mL 0     No current facility-administered medications for this visit.        Social History:  Social History     Social History    Marital status:      Spouse name: N/A    Number of children: N/A    Years of education: N/A     Occupational History    Not on file.     Social History Main Topics    Smoking status: Former Smoker    Smokeless tobacco: Current User     Types: Chew, Snuff      Comment: quit smoking cigarettes in the 1980s; smoked cigarettes for 2-3 months and at the most 1 ppw; he quit smoking cigars in the 1990s - states he smoked cigars for 4 years and at the most smoked 4 cigars a day    Alcohol use No      Comment: quit in ~2012; previously was a social beer drinker    Drug use: No    Sexual activity: Yes     Other Topics Concern    Not on file     Social History Narrative    No narrative on file       Family History:   Family History   Problem Relation Age of Onset    Kidney disease Maternal Aunt     No Known Problems Son     No Known Problems Daughter        Review of Systems  Constitutional: +occasional night sweats - advised him to check his blood sugar; Denies fever/chills, fatigue  EENT: +wears eye glasses; Denies vision problems, hearing problems, trouble swallowing.   Respiratory: Denies shortness of breath, dyspnea on exertion, orthopnea, wheezing, hemoptysis, denies known TB exposure or history of positive TB skin  "test  Cardiovascular: +he states he will have chest pain every now and then - lasts for ~10 minutes; at times even occurs at rest; last episode was last week - sons states they have evaluated this as well but found nothing?; Denies palpitations, history of MI, history of stroke, history of DVT  Gastrointestinal: +occasional constipation; Denies abdominal pain, nausea/vomiting/diarrhea. Denies history of GI bleeding or ulcers.   Genitourinary: +dysuria every once in a while - last episode was a couple of days ago; Denies history of kidney stones, recurrent UTI's, history of urinary obstruction, gross hematuria, urinary frequency, incontinence  Musculoskeletal: Denies trouble moving extremities, denies joint pain or swelling  Skin: Denies history of skin cancer, denies rash, ulcerations  Heme/onc: Denies any history of cancer, denies history of coagulopathies or bleeding disorders  Endocrine: Denies thyroid disease, unintentional weight loss/weight gain  Neurological: +peripheral neuropathy; +occasional headaches; Denies tremors, seizures, dizziness  Psychiatric: Denies anxiety, depression. Denies hallucinations or delusions.    Potential Donors: I don't know      Objective:   Blood pressure 136/75, pulse 73, temperature 98.1 °F (36.7 °C), temperature source Oral, resp. rate 16, height 5' 8.94" (1.751 m), weight 99.1 kg (218 lb 7.6 oz), SpO2 97 %.body mass index is 32.32 kg/m².    Physical Exam  General: No acute distress, well groomed, alert and oriented x 3  HEENT: Normocephalic, atraumatic, PERRLA, sclera anicteric, conjunctiva/corneas clear, EOM's intact bilaterally, external inspection of ears and nose normal, moist mucous membranes; cyanotic appearing buccal mucosa, chewing tobacco residue   Neck: Supple, symmetrical, trachea midline, no masses, no carotid bruits, no JVD, thyroid is normal without nodules or enlargement   Respiratory: Clear to auscultation bilaterally, respirations unlabored, no " rales/rhonchi/wheezing   Cardiovacular: Regular rate and rhythm, S1, S2 normal, no murmurs, no rubs or gallops   Gastrointestinal: Soft, non-tender, bowel sounds normal, no masses, no palpable organomegaly  Extremities: No clubbing or cyanosis of upper extremities bilaterally, no pedal edema bilaterally; +2 bilateral radial pulses  Skin: warm and dry; no rash on exposed skin  Lymph nodes: Cervical and supraclavicular nodes normal   Neurologic: No focal neurologic deficits.   Musculoskeletal: moves all extremities without difficulty, FROM, 5/5 strength, ambulates without an assistive device  Psychiatric: Normal mood and affect. Responds appropriately to questions.  Access: LUE AVF +thrill/bruit     Labs:  Lab Results   Component Value Date    WBC 4.77 07/18/2018    HGB 12.2 (L) 07/18/2018    HCT 40.0 07/18/2018     07/18/2018    K 5.0 07/18/2018     07/18/2018    CO2 28 07/18/2018    BUN 40 (H) 07/18/2018    CREATININE 4.6 (H) 07/18/2018    EGFRNONAA 12.0 (A) 07/18/2018    CALCIUM 9.7 07/18/2018    PHOS 3.1 07/18/2018    ALBUMIN 3.7 07/18/2018    AST 17 07/18/2018    ALT 31 07/18/2018    .0 (H) 07/18/2018       No results found for: PREALBUMIN, BILIRUBINUA, GGT, AMYLASE, LIPASE, PROTEINUA, NITRITE, RBCUA, WBCUA    No results found for: HLAABCTYPE    Labs were reviewed with the patient.    Assessment:     1. ESRD on dialysis since 9/30/17    2. Anemia of renal disease    3. Essential hypertension    4. Hyperlipidemia, unspecified hyperlipidemia type    5. Pre-transplant evaluation for CKD (chronic kidney disease)    6. History of syncope        Plan:     Transplant Candidacy:   After obtaining history and performing physical exam as well as reviewing available diagnostic studies, Mr. Nguyen is a high-risk kidney transplant candidate secondary to advanced age, lack of living donors thus anticipated long wait time, significant chewing tobacco history, ?claudication symptoms, 3-4 episodes of syncope,  ?chest pain.  Final determination of transplant candidacy will be made once workup is complete and reviewed by the selection committee.    Prior to Listing, will need the following items to be completed:  1. Cardiac stress test   2. Standard serologies and blood work  3. Iliac Doppler  4. Non-contrast CT A/P  5. UA and UPC given ?dysuria  6. Obtain records from hospitalizations for syncopal episodes   7. Op report of hernia repair     Counseled him on cessation of all tobacco products.     Exercise: reminded Quinton of the importance of regular exercise for weight management, blood sugar and blood pressure management.  I also explained exercise has been shown to improve cardiovascular health, energy level, and sleep hygiene.  Lastly, I advised him that cardiovascular complications are leading cause of death and regular exercise can help lower this risk.    Encouraged him to seek living donors     Edwina Santana MD       [facility] Patient Status  Functional Status: 60% - Requires occasional assistance but is able to care for needs  Physical Capacity: No Limitations

## 2018-07-18 NOTE — NURSING
Reviewed pt transplant labs.  Pt to continue to follow-up with dialysis unit dietitians recommendations.      Vera Aquino MS RD LDN

## 2018-07-18 NOTE — PROGRESS NOTES
Pt received the Hepatitis A vaccination. Pt tolerated the injection well. Pt plans to get future vaccinations at a facility closer to home. Pt already has an Rx for this. Pt left the unit in NAD.

## 2018-07-20 ENCOUNTER — TELEPHONE (OUTPATIENT)
Dept: TRANSPLANT | Facility: CLINIC | Age: 68
End: 2018-07-20

## 2018-07-20 NOTE — TELEPHONE ENCOUNTER
Nutritional assessment from dialysis unit received and reviewed--no nutritional changes to plan needed at this time.  Pt to continue to follow-up with renal dietitians recommendations.      Vera Aquino MS RD LDN

## 2018-07-26 ENCOUNTER — SOCIAL WORK (OUTPATIENT)
Dept: TRANSPLANT | Facility: CLINIC | Age: 68
End: 2018-07-26

## 2018-08-08 DIAGNOSIS — Z76.82 ORGAN TRANSPLANT CANDIDATE: Primary | ICD-10-CM

## 2018-09-12 ENCOUNTER — TELEPHONE (OUTPATIENT)
Dept: TRANSPLANT | Facility: CLINIC | Age: 68
End: 2018-09-12

## 2018-09-12 DIAGNOSIS — Z76.82 ORGAN TRANSPLANT CANDIDATE: Primary | ICD-10-CM

## 2018-09-12 NOTE — TELEPHONE ENCOUNTER
Spoke to patient who states he did not receive the records request letter we sent on 8/8/18. Pt would like to be scheduled here for stress test, echo, cards consult, CT abd/pelvis and iliac ultrasound.

## 2018-10-24 ENCOUNTER — HOSPITAL ENCOUNTER (OUTPATIENT)
Dept: RADIOLOGY | Facility: HOSPITAL | Age: 68
Discharge: HOME | End: 2018-10-24
Attending: TRANSPLANT SURGERY
Payer: MEDICARE

## 2018-10-24 ENCOUNTER — DOCUMENTATION ONLY (OUTPATIENT)
Dept: CARDIOLOGY | Facility: CLINIC | Age: 68
End: 2018-10-24

## 2018-10-24 ENCOUNTER — OFFICE VISIT (OUTPATIENT)
Dept: CARDIOLOGY | Facility: CLINIC | Age: 68
End: 2018-10-24
Payer: MEDICARE

## 2018-10-24 ENCOUNTER — HOSPITAL ENCOUNTER (OUTPATIENT)
Dept: CARDIOLOGY | Facility: CLINIC | Age: 68
Discharge: HOME | End: 2018-10-24
Attending: INTERNAL MEDICINE
Payer: MEDICARE

## 2018-10-24 VITALS
DIASTOLIC BLOOD PRESSURE: 64 MMHG | OXYGEN SATURATION: 98 % | BODY MASS INDEX: 33.58 KG/M2 | HEART RATE: 69 BPM | WEIGHT: 221.56 LBS | SYSTOLIC BLOOD PRESSURE: 116 MMHG | HEIGHT: 68 IN

## 2018-10-24 DIAGNOSIS — E11.21 TYPE 2 DIABETES MELLITUS WITH DIABETIC NEPHROPATHY, WITH LONG-TERM CURRENT USE OF INSULIN: Chronic | ICD-10-CM

## 2018-10-24 DIAGNOSIS — Z01.810 PREOPERATIVE CARDIOVASCULAR EXAMINATION: Primary | ICD-10-CM

## 2018-10-24 DIAGNOSIS — I10 ESSENTIAL HYPERTENSION: ICD-10-CM

## 2018-10-24 DIAGNOSIS — N18.6 ESRD ON DIALYSIS: Chronic | ICD-10-CM

## 2018-10-24 DIAGNOSIS — Z76.82 ORGAN TRANSPLANT CANDIDATE: ICD-10-CM

## 2018-10-24 DIAGNOSIS — Z99.2 ESRD ON DIALYSIS: Chronic | ICD-10-CM

## 2018-10-24 DIAGNOSIS — Z79.4 TYPE 2 DIABETES MELLITUS WITH DIABETIC NEPHROPATHY, WITH LONG-TERM CURRENT USE OF INSULIN: Chronic | ICD-10-CM

## 2018-10-24 DIAGNOSIS — I51.7 CARDIOMEGALY: ICD-10-CM

## 2018-10-24 DIAGNOSIS — E78.5 HYPERLIPIDEMIA, UNSPECIFIED HYPERLIPIDEMIA TYPE: ICD-10-CM

## 2018-10-24 LAB
ESTIMATED PA SYSTOLIC PRESSURE: 18.37
MITRAL VALVE REGURGITATION: NORMAL
RETIRED EF AND QEF - SEE NOTES: 55 (ref 55–65)
TRICUSPID VALVE REGURGITATION: NORMAL

## 2018-10-24 PROCEDURE — 99204 OFFICE O/P NEW MOD 45 MIN: CPT | Mod: S$GLB,,, | Performed by: INTERNAL MEDICINE

## 2018-10-24 PROCEDURE — 74176 CT ABD & PELVIS W/O CONTRAST: CPT | Mod: TC,TXP

## 2018-10-24 PROCEDURE — 74176 CT ABD & PELVIS W/O CONTRAST: CPT | Mod: 26,TXP,, | Performed by: RADIOLOGY

## 2018-10-24 PROCEDURE — 99999 PR PBB SHADOW E&M-EST. PATIENT-LVL IV: CPT | Mod: PBBFAC,TXP,, | Performed by: INTERNAL MEDICINE

## 2018-10-24 PROCEDURE — 99214 OFFICE O/P EST MOD 30 MIN: CPT | Mod: PBBFAC,TXP,25 | Performed by: INTERNAL MEDICINE

## 2018-10-24 PROCEDURE — 93351 STRESS TTE COMPLETE: CPT | Mod: 26,S$PBB,TXP, | Performed by: INTERNAL MEDICINE

## 2018-10-24 PROCEDURE — C8930 TTE W OR W/O CONTR, CONT ECG: HCPCS | Mod: PBBFAC,TXP | Performed by: INTERNAL MEDICINE

## 2018-10-24 PROCEDURE — 1101F PT FALLS ASSESS-DOCD LE1/YR: CPT | Mod: CPTII,S$GLB,, | Performed by: INTERNAL MEDICINE

## 2018-10-24 PROCEDURE — 93320 DOPPLER ECHO COMPLETE: CPT | Mod: PBBFAC,TXP | Performed by: INTERNAL MEDICINE

## 2018-10-24 PROCEDURE — 93325 DOPPLER ECHO COLOR FLOW MAPG: CPT | Mod: 26,S$PBB,TXP, | Performed by: INTERNAL MEDICINE

## 2018-10-24 PROCEDURE — 3045F PR MOST RECENT HEMOGLOBIN A1C LEVEL 7.0-9.0%: CPT | Mod: CPTII,S$GLB,, | Performed by: INTERNAL MEDICINE

## 2018-10-24 RX ORDER — CARVEDILOL 25 MG/1
25 TABLET ORAL 2 TIMES DAILY WITH MEALS
COMMUNITY

## 2018-10-24 NOTE — PROGRESS NOTES
Patient verified by 2 identifiers and allergies reviewed.  22g IV placed to Rt AC.  Denies previous reactions to blood transfusions.  DSE & Optison consents obtained.  3cc Optison administered, echo images obtained, DSE testing stopped at 20mcg due to BP above testing parameters.  Pt denied C/O HA, visual changes, etc.  BP returned to pt's baseline during the recovery period. IV removed post testing.  Post study discharge instructions reviewed with patient, patient verbalized understanding.  Patient discharged to home in stable condition.

## 2018-10-24 NOTE — PROGRESS NOTES
Moderate  calcifications.   Hopefully doppler is ok.   He has relative sparing of external iliacs, right better than left.     Need repeat CT in 2 years if not transplanted.

## 2018-10-24 NOTE — PROGRESS NOTES
Subjective:   Patient ID:  Quinton Nguyen is a 68 y.o. male who presents for  Kidney Transplant Evaluation      HPI:   The patient  presents for risk stratification for kidney transplantation. The patient has had hypertension but no other CV disease. Quinton Nguyen has end stage kidney disease on dialysis .  Review of Systems   Constitution: Negative for weakness, malaise/fatigue, weight gain and weight loss.   Eyes: Negative for blurred vision.   Cardiovascular: Negative for chest pain, claudication, cyanosis, dyspnea on exertion, irregular heartbeat, leg swelling, near-syncope, orthopnea, palpitations, paroxysmal nocturnal dyspnea and syncope.   Respiratory: Negative for cough, shortness of breath and wheezing.    Musculoskeletal: Negative for falls and myalgias.        Pain in his right groin when standing for long periods or walking long distances.   Gastrointestinal: Negative for abdominal pain, heartburn, nausea and vomiting.   Genitourinary: Negative for nocturia.   Neurological: Negative for brief paralysis, dizziness, focal weakness, headaches, numbness and paresthesias.   Psychiatric/Behavioral: Negative for altered mental status.     Quinton Nguyen denies chest pain, shortness of breath, palpitations, presyncope , or syncope. Quinton Nguyen has diabetes as well as dyslipidemia on high intensity statin. He had a stress echo today with results pending.    Current Outpatient Medications   Medication Sig    aspirin (ECOTRIN) 81 MG EC tablet Take 81 mg by mouth once daily.    atorvastatin (LIPITOR) 40 MG tablet Take 40 mg by mouth once daily.    carvedilol (COREG) 25 MG tablet Take 25 mg by mouth 2 (two) times daily with meals.    cloNIDine (CATAPRES) 0.2 MG tablet Take 0.2 mg by mouth 2 (two) times daily.    DULoxetine (CYMBALTA) 60 MG capsule Take 60 mg by mouth once daily.    hepatitis A virus vaccine, PF, (HAVRIX) 1,440 BIBI unit/mL Susp Inject into the muscle.    insulin aspart U-100 (NOVOLOG  "FLEXPEN U-100 INSULIN) 100 unit/mL InPn pen Inject into the skin. Inject per Sliding scale three times a day with meals (3-5 units)    insulin detemir U-100 (LEVEMIR) 100 unit/mL injection Inject 40 Units into the skin every evening.     pantoprazole (PROTONIX) 40 MG tablet Take 40 mg by mouth once daily.    pregabalin (LYRICA) 150 MG capsule Take 150 mg by mouth 2 (two) times daily.    tamsulosin (FLOMAX) 0.4 mg Cap Take 0.4 mg by mouth once daily.    traMADol (ULTRAM) 50 mg tablet Take 50 mg by mouth 2 (two) times daily.     No current facility-administered medications for this visit.      Objective:   Physical Exam   Constitutional: He is oriented to person, place, and time. He appears well-developed. No distress.   /64 (BP Location: Left arm, Patient Position: Sitting, BP Method: Large (Manual))   Pulse 69   Ht 5' 8" (1.727 m)   Wt 100.5 kg (221 lb 9 oz)   SpO2 98%   BMI 33.69 kg/m²    HENT:   Head: Normocephalic.   Right Ear: External ear normal.   Left Ear: External ear normal.   Eyes: EOM are normal. Pupils are equal, round, and reactive to light. No scleral icterus.   Neck: Neck supple. No JVD present. No thyromegaly present.   Cardiovascular: Normal rate, regular rhythm and intact distal pulses. PMI is not displaced. Exam reveals no gallop and no friction rub.   Murmur heard.   Medium-pitched midsystolic murmur is present with a grade of 2/6 at the upper left sternal border and lower left sternal border.  Patent shunt left UE   Pulmonary/Chest: Effort normal and breath sounds normal. No respiratory distress. He has no wheezes. He has no rales.   Abdominal: Soft. He exhibits no distension. There is no hepatosplenomegaly. There is no tenderness.   Musculoskeletal: He exhibits no edema or tenderness.   Gait normal   Neurological: He is alert and oriented to person, place, and time.   Skin: Skin is warm and dry. No rash noted.   Psychiatric: He has a normal mood and affect. His behavior is " normal.       Lab Results   Component Value Date     07/18/2018    K 5.0 07/18/2018     07/18/2018    CO2 28 07/18/2018    BUN 40 (H) 07/18/2018    CREATININE 4.6 (H) 07/18/2018     (H) 07/18/2018    HGBA1C 8.0 (H) 07/18/2018    AST 17 07/18/2018    ALT 31 07/18/2018    ALBUMIN 3.7 07/18/2018    PROT 6.9 07/18/2018    BILITOT 0.5 07/18/2018    WBC 4.77 07/18/2018    HGB 12.2 (L) 07/18/2018    HCT 40.0 07/18/2018    MCV 94 07/18/2018     (L) 07/18/2018    CHOL 128 07/18/2018    HDL 41 07/18/2018    LDLCALC 75.2 07/18/2018    TRIG 59 07/18/2018       Assessment:     1. Preoperative cardiovascular examination    2. ESRD on dialysis since 9/30/17    3. Essential hypertension : Good control.   4. Hyperlipidemia, unspecified hyperlipidemia type :  on high intensity statin At LDL goal   5. Type 2 diabetes mellitus with diabetic nephropathy, with long-term current use of insulin        Plan:     Quinton was seen today for kidney transplant evaluation.    Diagnoses and all orders for this visit:    Preoperative cardiovascular examination  Assuming the patient's stress test is non-ischemic, he will be  an acceptable cardiovascular risk for the anticipated transplant surgery  ESRD on dialysis since 9/30/17    Essential hypertension    Hyperlipidemia, unspecified hyperlipidemia type    Type 2 diabetes mellitus with diabetic nephropathy, with long-term current use of insulin

## 2018-10-24 NOTE — LETTER
October 24, 2018      Edwina Santana MD  [address]  [facility] [address]           [facility] Card[facility]  [address]  [facility] [address]  Phone: [phone]          Patient: Quinton Nguyen   MR Number: [id number]   YOB: 1950   Date of Visit: 10/24/2018       Dear Dr. Ewdina Santana:    Thank you for referring Quinton Nguyen to me for evaluation. Attached you will find relevant portions of my assessment and plan of care.    If you have questions, please do not hesitate to call me. I look forward to following Quinton Nguyen along with you.    Sincerely,    Asad Shah MD    Enclosure  CC:  No Recipients    If you would like to receive this communication electronically, please contact [email or web address] or [phone] to request more information on [employer] access.    For providers and/or their staff who would like to refer a patient to [facility], please contact us through our one-stop-shop provider referral line, [facility], at [phone].    If you feel you have received this communication in error or would no longer like to receive these types of communications, please e-mail [email or web address]

## 2018-11-06 ENCOUNTER — TELEPHONE (OUTPATIENT)
Dept: TRANSPLANT | Facility: CLINIC | Age: 68
End: 2018-11-06

## 2018-11-06 NOTE — TELEPHONE ENCOUNTER
----- Message from Asad Shah MD sent at 11/6/2018 10:43 AM CST -----  The stress test was non-ischemic but only reached 49% of target heart rate. In patient's in this age group , pharmacologic nuclear stress with regadenoson a better option, reserving stress echo for the younger patients where radiation exposure is an issue.  ----- Message -----  From: Jaimie Morgan  Sent: 11/6/2018   8:52 AM  To: MD Dr. Pablo Simon,    Mr. Nguyen had a nuclear stress test and echo on 10/24. Please review and advise if he is cleared to proceed with kidney transplant surgery. Thank you.

## 2018-11-09 ENCOUNTER — COMMITTEE REVIEW (OUTPATIENT)
Dept: TRANSPLANT | Facility: CLINIC | Age: 68
End: 2018-11-09

## 2018-11-09 NOTE — LETTER
November 9, 2018    Quinton Nguyen  [address] [place] [address]    Dear Quinton Nguyen:  MRN: [id number]    It is the duty of the [facility] Selection Committee to determine which patients are candidates for a transplant. For this reason, our committee has the difficult task of evaluating patients to determine which ones have the greatest chance of having a successful transplant. We are aware of the magnitude of this responsibility, and we approach it with reverence and humility.    It is with regret I inform you that you are not approved as a transplant candidate due to extensive vascular calcifications prohibiting surgical placement of a transplanted kidney. Your future transplant appointments for kidney transplant have been canceled.  Based on this review, we have determined that at this time, you are not a candidate for a transplant at [facility].      The selection committee carefully considers each patient's transplant candidacy and determines whether it is safe to proceed with transplantation on a case-by-case basis using established selection criteria.  At present, the risk of proceeding with an elective transplant surgery has become too high.                                                                               Although the selection committee believes you are not a suitable transplant candidate, you have the option to be evaluated at other transplant centers who may have different selection criteria.  You may request your [facility] records be sent to any center of your choice by contacting our Medical Records Department at [phone].                                                                               Attached is a letter from the [employer] Organ [employer]aring (UNOS).  It describes the services and information offered to patients by UNOS and the Organ Procurement and Transplant Network.    The [facility] Selection Committee sincerely wishes you the  best and remains available to answer any questions.  Please do not hesitate to contact our pre-transplant office if we can assist you in any other way.                                                                               Sincerely,      Saundra Rogers MD  Medical Director, Kidney & Kidney/Pancreas Transplantation    Cc: Dr. Mcmahon/[facility]    [facility]/    Encl: UNOS Letter          OPTN/UNOS: Your Resource for Organ Transplant Information        If you have a question regarding your own medical care, you always should call your transplant center first. However, for general organ transplant-related information, you can call the [employer] (UNOS) toll-free patient services line at [phone].    Anyone, including potential transplant candidates, recipients, family members/friends, living donors, and/or donor family members can call this number to:    · talk about organ donation, living donation, how transplant and donation work, the donation process, transplant policies, and transplant/donor information;  · get a free patient information kit with helpful booklets, waiting list and transplant information, and a list of all transplant centers;  · ask questions about the Organ Procurement and Transplantation Network (OPTN) web site (www.optn.transplant.hrsa.gov); the UNOS Web site (www.unos.org); or the UNOS web site for living donors and transplant recipients (www.transplantliving.org);  · learn how UNOS and the OPTN can help you;  · talk about any concerns that you may have with a transplant center and how they perform    UNOS is a not-for-profit organization that provides all of the administrative services for the national OPTN under federal contract to the Health Resources and Services Administration (HRSA), an agency under the U.S. Department of Health and Human Services (HHS).     UNOS and OPTN responsibilities include:    · writing educational material for  patients, the public and professionals;  · helping to make people aware of the need for donated organs and tissue;  · writing organ transplant policy with help from doctors, nurses, transplant patients/candidates, donor families, living donors, and the public;  · coordinating the organ matching and placement process;  · collecting information about every organ transplant and donation that occurs in the [place].    Remember, you should contact your transplant center directly if you have questions or concerns about your own medical care including medical records, work-up progress and test reports. [facility] is not your transplant center, and staff at [facility] will not be able to transfer you to your transplant center, so keep your transplant centers phone number handy. But, while you research your transplant needs and learn as much as you can about transplantation and donation, we welcome your call to our toll-free patient services line at [phone].